# Patient Record
Sex: FEMALE | Race: WHITE | NOT HISPANIC OR LATINO | Employment: FULL TIME | URBAN - METROPOLITAN AREA
[De-identification: names, ages, dates, MRNs, and addresses within clinical notes are randomized per-mention and may not be internally consistent; named-entity substitution may affect disease eponyms.]

---

## 2022-12-21 NOTE — PROGRESS NOTES
"Chief Complaint  Consult (Excess Skin on Abdomen & Thighs)    Subjective           HPI   Deysi Ta is a 47 y.o. female who presents to [facility] as a consult  for   Chief Complaint   Patient presents with   • Consult     Excess Skin on Abdomen & Thighs   .   Patient has had gastric bypass. Starting weight is 270   lbs, current weight is 137 lbs, goal weight 137 lbs. Patient weight has been stable at current weight for 4m. Patient has rash and moisture under abdominal fold. Treats with Goldbond and Baby Powder and also uses blow dryer. History of surgeries: 2  Patient has 2 children, is done having children. Patient does not have issues with activities of daily living. Her concerns today are her abdomen and thighs.   Patient states that she does not have any additional skin retraction, with the additional weight loss, she developed more loose abdominal skin.  The lower abdominal skin hangs over the belt line causing rashes.  She has tried compression garments with no relief.  These rashes are worse in the summer.  The patient also concerned over excess medial thigh skin  Allergies: Coconut  Allergies Reconciled.    Review of Systems  All system were reviewed and were negative, except the ones noted above.     Objective     /71 (BP Location: Left arm)   Pulse 76   Temp 98 °F (36.7 °C) (Temporal)   Ht 165.1 cm (65\")   Wt 62.5 kg (137 lb 12.8 oz)   SpO2 97%   BMI 22.93 kg/m²     Body mass index is 22.93 kg/m².    Physical Exam   Cardiovascular: Normal rate.     Pulmonary/Chest  Effort normal.      • Patient is not tachycardic   • Respiration is non elaborated.   • Excess skin above and below umbilicus with grade 3 panniculus. Significant moisture, hyperpigmentation, and irritation over lower abdominal fold, does not have palpable hernia, and no open areas. Lipodystrophy of abdomen.  • The excess lower abdominal skin hangs over the belt line " approximately 6 cm.  • No diastases noted.      Result Review :       Procedures              Diagnoses and all orders for this visit:    1. Panniculitis (Primary)    2. History of weight loss surgery    3. History of weight loss        Plan:  • Panniculectomy    • I had a long discussion with the patient regarding her  panniculitis. I have explained to her  that I believe she would benefit from panniculectomy to address her excess hanging skin. I explained that this would involve a low transverse incision and would only address the excess skin below the umbilicus. She  understands that this is not a cosmetic body contouring procedure.  Scar position was discussed, including long transverse incision. We discussed recovery time which would include no heavy lifting for 4-6 weeks, drains, and abdominal binder. We discussed risks including but not limited to bleeding, infection, pain, wound healing problems, and blood clot.  • Discussed r/b of liposuction including, bruising, bleeding, infection, numbness, scarring, pain, fat embolism, asymmetries and contour irregularities. Client has realistic expectations and would like to proceed.  She understands liposuction will not be a part of a panniculectomy.  She will lose her umbilicus as a part of this panniculectomy.  There will be minimal undermining in order to redraped the skin, panniculectomy is more of a wedge resection to remove the excess lower abdominal skin.  • The patient would be an excellent candidate for panniculectomy.  Scar position was discussed, including .. . Discussed risks including, but not limited to wound healing problems, scar, blood clot, fluid collection, infection. Patient understands no heavy lifting or working out for 6 weeks and importance of ambulating post op to prevent a blood clot.        Scribed by Silvino Larson MD, acting as a scribe for Silvino Larsno MD, 12/22/22 11:29 EST.  Silvino Larson MD's signature  on the note affirms that the note adequately documents the care provided.    Patient is concerned about the scars on the medial thighs after medial thigh lift.  She understands the scar will extend from the groin down to the medial knee.  It be visible in shorts.  Even though the skin bothers her, she is concerned about the scar visibility and location and would like to give this portion of the consultation further thought.  She understands that a medial thigh lift will not be covered by insurance.      CPT code 46556    Follow Up     Return in about 4 weeks (around 1/19/2023), or For preop visit.    Patient was given instructions and counseling regarding her condition. Please see specific information pulled into the AVS if appropriate.     Silvino Larson MD  12/22/2022

## 2022-12-22 ENCOUNTER — OFFICE VISIT (OUTPATIENT)
Dept: PLASTIC SURGERY | Facility: CLINIC | Age: 47
End: 2022-12-22

## 2022-12-22 ENCOUNTER — PREP FOR SURGERY (OUTPATIENT)
Dept: OTHER | Facility: HOSPITAL | Age: 47
End: 2022-12-22

## 2022-12-22 VITALS
WEIGHT: 137.8 LBS | TEMPERATURE: 98 F | OXYGEN SATURATION: 97 % | DIASTOLIC BLOOD PRESSURE: 71 MMHG | SYSTOLIC BLOOD PRESSURE: 104 MMHG | HEART RATE: 76 BPM | BODY MASS INDEX: 22.96 KG/M2 | HEIGHT: 65 IN

## 2022-12-22 DIAGNOSIS — Z87.898 HISTORY OF WEIGHT LOSS: ICD-10-CM

## 2022-12-22 DIAGNOSIS — Z98.84 HISTORY OF WEIGHT LOSS SURGERY: ICD-10-CM

## 2022-12-22 DIAGNOSIS — M79.3 PANNICULITIS: Primary | ICD-10-CM

## 2022-12-22 PROCEDURE — 99204 OFFICE O/P NEW MOD 45 MIN: CPT | Performed by: SURGERY

## 2022-12-22 RX ORDER — BUPROPION HYDROCHLORIDE 300 MG/1
300 TABLET ORAL EVERY MORNING
COMMUNITY
Start: 2022-12-18

## 2022-12-22 RX ORDER — CEFAZOLIN SODIUM 2 G/100ML
2 INJECTION, SOLUTION INTRAVENOUS ONCE
Status: CANCELLED | OUTPATIENT
Start: 2022-12-22 | End: 2022-12-22

## 2022-12-22 RX ORDER — SEMAGLUTIDE 2.4 MG/.75ML
INJECTION, SOLUTION SUBCUTANEOUS
COMMUNITY
Start: 2022-12-18

## 2022-12-22 RX ORDER — AMOXICILLIN 875 MG/1
875 TABLET, COATED ORAL 2 TIMES DAILY
COMMUNITY
Start: 2022-12-14

## 2022-12-28 ENCOUNTER — TELEPHONE (OUTPATIENT)
Dept: PLASTIC SURGERY | Facility: CLINIC | Age: 47
End: 2022-12-28

## 2022-12-28 NOTE — TELEPHONE ENCOUNTER
Patient called to check if we have heard from insurance regarding auth decision?    Advised patient the authorization team will not submit to insurance until surgery is scheduled and then they typically submit to insurance 2-3 weeks before the surgery date. At this time we still do not have OR days for Dr. Larson, as soon as we get these days that he will be in surgery we will be calling to pick a surgery date.

## 2022-12-30 ENCOUNTER — PATIENT ROUNDING (BHMG ONLY) (OUTPATIENT)
Dept: PLASTIC SURGERY | Facility: CLINIC | Age: 47
End: 2022-12-30

## 2022-12-30 NOTE — PROGRESS NOTES
12/30/2022    My name is Barbara Silva, ELVIRA with [facility].    I want to officially welcome you to our practice and ask about your recent visit.     If you could please tell me about your recent visit with us. What things went well?     Fine, he helped me understand the procedure and what I wanted done.    We're always looking for ways to make our patients' experiences even better. Do you have recommendations on ways we may improve?    You don't have OR dates, so that is all I can think of at the moment.    Overall were you satisfied with your first visit to our practice?    Yeah, yes.    I appreciate you taking the time to answer a few questions today.    Please note that you may also receive a survey related to your visit, should you receive that we ask that you please complete it and return it so that we can monitor how we are doing with overall patient care.     Thank you and have a great day.    Barbara Silva, ELVIRA

## 2023-01-04 ENCOUNTER — TELEPHONE (OUTPATIENT)
Dept: PLASTIC SURGERY | Facility: CLINIC | Age: 48
End: 2023-01-04
Payer: COMMERCIAL

## 2023-03-31 ENCOUNTER — TELEPHONE (OUTPATIENT)
Dept: PLASTIC SURGERY | Facility: CLINIC | Age: 48
End: 2023-03-31
Payer: COMMERCIAL

## 2023-03-31 PROBLEM — M79.3 PANNICULITIS: Status: ACTIVE | Noted: 2023-03-31

## 2023-03-31 NOTE — TELEPHONE ENCOUNTER
CALLED PATIENT TO SCHEDULE SURGERY. PATIENT DID NOT ANSWER, LEFT VOICEMAIL. FIRST ATTEMPT TO CONTACT.

## 2023-04-18 NOTE — H&P (VIEW-ONLY)
"Pre-op Exam (Pre Op Exam for 05/26/2023)            History of Present Illness  Deysi Ta is a 47 y.o. female who presents to [facility] for Pre-Operative Examination for Panniculectomy 05/26/2023.     Pt presents today for pre-op.    Subjective        Coconut  Allergies Reconciled.    Review of Systems   All review of system has been reviewed and it  is negative except the ones note above.     Objective     /70 (BP Location: Left arm)   Pulse 80   Temp 98.3 °F (36.8 °C) (Temporal)   Ht 165.1 cm (65\")   Wt 62.7 kg (138 lb 3.2 oz)   SpO2 98%   BMI 23.00 kg/m²     Body mass index is 23 kg/m².    Physical Exam   Cardiovascular: Normal rate.     Pulmonary/Chest  Effort normal.   :   Abdomen  No interval change on evaluation of abdominal pannus.  No evidence of infection or active rash.  Result Review :                Assessment and Plan      Diagnoses and all orders for this visit:    1. Pre-operative examination (Primary)  -     Nicotine Screen, Urine - Urine, Clean Catch; Future    2. Panniculitis        Plan:    Panniculectomy    The risks and benefits of the procedure were discussed with the patient.  The risk described included, but not limited to, bleeding, infection, need for revision surgeries, seroma, hematoma, poor cosmetic result, poor functional result, edge necrosis, edge separation, deep venous thrombosis, pulmonary embolus, pneumonia, heart attack, stroke, death.  Her questions were answered. She still agrees to the procedure.    Discussed will create new belly button in clinic after pt healed.  Ordered nicotine urine; pt aware  Sent post op medications to pharmacy        Scribed by Abby Hamlin, acting as a scribe for Silvino Larson MD, 04/28/23 11:29 EDT.  Silvino Larson MD's signature on the note affirms that the note adequately documents the care provided.        Follow Up     Return Return to clinic 1 week after " surgery has appt schd 6/5/23.    Patient was given instructions and counseling regarding her condition. Please see specific information pulled into the AVS if appropriate.     Abby Hamlin  04/28/2023

## 2023-04-18 NOTE — PROGRESS NOTES
"Pre-op Exam (Pre Op Exam for 05/26/2023)            History of Present Illness  Deysi Ta is a 47 y.o. female who presents to [facility] for Pre-Operative Examination for Panniculectomy 05/26/2023.     Pt presents today for pre-op.    Subjective        Coconut  Allergies Reconciled.    Review of Systems   All review of system has been reviewed and it  is negative except the ones note above.     Objective     /70 (BP Location: Left arm)   Pulse 80   Temp 98.3 °F (36.8 °C) (Temporal)   Ht 165.1 cm (65\")   Wt 62.7 kg (138 lb 3.2 oz)   SpO2 98%   BMI 23.00 kg/m²     Body mass index is 23 kg/m².    Physical Exam   Cardiovascular: Normal rate.     Pulmonary/Chest  Effort normal.   :   Abdomen  No interval change on evaluation of abdominal pannus.  No evidence of infection or active rash.  Result Review :                Assessment and Plan      Diagnoses and all orders for this visit:    1. Pre-operative examination (Primary)  -     Nicotine Screen, Urine - Urine, Clean Catch; Future    2. Panniculitis        Plan:    Panniculectomy    The risks and benefits of the procedure were discussed with the patient.  The risk described included, but not limited to, bleeding, infection, need for revision surgeries, seroma, hematoma, poor cosmetic result, poor functional result, edge necrosis, edge separation, deep venous thrombosis, pulmonary embolus, pneumonia, heart attack, stroke, death.  Her questions were answered. She still agrees to the procedure.    Discussed will create new belly button in clinic after pt healed.  Ordered nicotine urine; pt aware  Sent post op medications to pharmacy        Scribed by Abby Hamlin, acting as a scribe for Silvino Larson MD, 04/28/23 11:29 EDT.  Silvino Larson MD's signature on the note affirms that the note adequately documents the care provided.        Follow Up     Return Return to clinic 1 week after " surgery has appt schd 6/5/23.    Patient was given instructions and counseling regarding her condition. Please see specific information pulled into the AVS if appropriate.     Abby Hamlin  04/28/2023

## 2023-04-21 ENCOUNTER — PREP FOR SURGERY (OUTPATIENT)
Dept: OTHER | Facility: HOSPITAL | Age: 48
End: 2023-04-21
Payer: COMMERCIAL

## 2023-04-21 DIAGNOSIS — M79.3 PANNICULITIS: Primary | ICD-10-CM

## 2023-04-21 RX ORDER — CEFAZOLIN SODIUM 2 G/100ML
2 INJECTION, SOLUTION INTRAVENOUS ONCE
OUTPATIENT
Start: 2023-04-21 | End: 2023-04-21

## 2023-04-28 ENCOUNTER — OFFICE VISIT (OUTPATIENT)
Dept: PLASTIC SURGERY | Facility: CLINIC | Age: 48
End: 2023-04-28
Payer: COMMERCIAL

## 2023-04-28 ENCOUNTER — LAB (OUTPATIENT)
Dept: LAB | Facility: HOSPITAL | Age: 48
End: 2023-04-28
Payer: COMMERCIAL

## 2023-04-28 VITALS
HEART RATE: 80 BPM | DIASTOLIC BLOOD PRESSURE: 70 MMHG | BODY MASS INDEX: 23.03 KG/M2 | WEIGHT: 138.2 LBS | OXYGEN SATURATION: 98 % | SYSTOLIC BLOOD PRESSURE: 121 MMHG | TEMPERATURE: 98.3 F | HEIGHT: 65 IN

## 2023-04-28 DIAGNOSIS — M79.3 PANNICULITIS: ICD-10-CM

## 2023-04-28 DIAGNOSIS — Z01.818 PRE-OPERATIVE EXAMINATION: ICD-10-CM

## 2023-04-28 DIAGNOSIS — Z01.818 PRE-OPERATIVE EXAMINATION: Primary | ICD-10-CM

## 2023-04-28 LAB — COTININE UR-MCNC: NEGATIVE NG/ML

## 2023-04-28 PROCEDURE — G0480 DRUG TEST DEF 1-7 CLASSES: HCPCS

## 2023-04-28 RX ORDER — CEPHALEXIN 500 MG/1
500 CAPSULE ORAL 4 TIMES DAILY
Qty: 20 CAPSULE | Refills: 0 | Status: SHIPPED | OUTPATIENT
Start: 2023-04-28 | End: 2023-05-03

## 2023-04-28 RX ORDER — OXYCODONE HYDROCHLORIDE AND ACETAMINOPHEN 5; 325 MG/1; MG/1
1-2 TABLET ORAL EVERY 6 HOURS PRN
Qty: 28 TABLET | Refills: 0 | Status: SHIPPED | OUTPATIENT
Start: 2023-04-28

## 2023-04-28 RX ORDER — PROMETHAZINE HYDROCHLORIDE 25 MG/1
25 TABLET ORAL EVERY 6 HOURS PRN
Qty: 20 TABLET | Refills: 0 | Status: SHIPPED | OUTPATIENT
Start: 2023-04-28

## 2023-05-24 NOTE — PRE-PROCEDURE INSTRUCTIONS
PATIENT INSTRUCTED TO BE:    - NPO AFTER MIDNIGHT EXCEPT CAN HAVE CLEAR LIQUIDS 2 HOURS PRIOR TO SURGERY ARRIVAL TIME     - TO HOLD ALL VITAMINS, SUPPLEMENTS, NSAIDS FOR ONE WEEK PRIOR TO THEIR SURGICAL PROCEDURE    - INSTRUCTED PT TO USE SURGICAL SOAP 1 TIME THE NIGHT PRIOR TO SURGERY OR THE AM OF SURGERY.   USE SOAP FROM NECK TO TOES AVOID THEIR FACE, HAIR, AND PRIVATE PARTS. INSTRUCTED NO LOTIONS, JEWELRY, PIERCINGS, OR DEODORANT DAY OF SURGERY        - INSTRUCTED TO TAKE THE FOLLOWING MEDICATIONS THE DAY OF SURGERY:   none    PATIENT VERBALIZED UNDERSTANDING

## 2023-05-26 ENCOUNTER — ANESTHESIA EVENT (OUTPATIENT)
Dept: PERIOP | Facility: HOSPITAL | Age: 48
End: 2023-05-26
Payer: COMMERCIAL

## 2023-05-26 ENCOUNTER — HOSPITAL ENCOUNTER (OUTPATIENT)
Facility: HOSPITAL | Age: 48
Setting detail: HOSPITAL OUTPATIENT SURGERY
Discharge: HOME | End: 2023-05-26
Attending: SURGERY | Admitting: SURGERY
Payer: COMMERCIAL

## 2023-05-26 ENCOUNTER — ANESTHESIA (OUTPATIENT)
Dept: PERIOP | Facility: HOSPITAL | Age: 48
End: 2023-05-26
Payer: COMMERCIAL

## 2023-05-26 VITALS
WEIGHT: 141.09 LBS | TEMPERATURE: 97.1 F | OXYGEN SATURATION: 97 % | SYSTOLIC BLOOD PRESSURE: 117 MMHG | HEIGHT: 65 IN | DIASTOLIC BLOOD PRESSURE: 84 MMHG | RESPIRATION RATE: 17 BRPM | BODY MASS INDEX: 23.51 KG/M2 | HEART RATE: 81 BPM

## 2023-05-26 DIAGNOSIS — M79.3 PANNICULITIS: ICD-10-CM

## 2023-05-26 LAB — B-HCG UR QL: NEGATIVE

## 2023-05-26 PROCEDURE — 25010000002 FENTANYL CITRATE (PF) 50 MCG/ML SOLUTION: Performed by: NURSE ANESTHETIST, CERTIFIED REGISTERED

## 2023-05-26 PROCEDURE — 88302 TISSUE EXAM BY PATHOLOGIST: CPT | Performed by: SURGERY

## 2023-05-26 PROCEDURE — 25010000002 CEFAZOLIN IN DEXTROSE 2-4 GM/100ML-% SOLUTION: Performed by: SURGERY

## 2023-05-26 PROCEDURE — 15847 EXC SKIN ABD ADD-ON: CPT

## 2023-05-26 PROCEDURE — 25010000002 SUGAMMADEX 200 MG/2ML SOLUTION: Performed by: NURSE ANESTHETIST, CERTIFIED REGISTERED

## 2023-05-26 PROCEDURE — 25010000002 PROPOFOL 10 MG/ML EMULSION: Performed by: NURSE ANESTHETIST, CERTIFIED REGISTERED

## 2023-05-26 PROCEDURE — 25010000002 DEXAMETHASONE PER 1 MG: Performed by: NURSE ANESTHETIST, CERTIFIED REGISTERED

## 2023-05-26 PROCEDURE — 0 HYDROMORPHONE 1 MG/ML SOLUTION: Performed by: NURSE ANESTHETIST, CERTIFIED REGISTERED

## 2023-05-26 PROCEDURE — 81025 URINE PREGNANCY TEST: CPT | Performed by: SURGERY

## 2023-05-26 PROCEDURE — 15847 EXC SKIN ABD ADD-ON: CPT | Performed by: SURGERY

## 2023-05-26 PROCEDURE — 15830 EXC EXCESSIVE SKIN ABDOMEN: CPT | Performed by: SURGERY

## 2023-05-26 PROCEDURE — 25010000002 ONDANSETRON PER 1 MG: Performed by: NURSE ANESTHETIST, CERTIFIED REGISTERED

## 2023-05-26 PROCEDURE — 15830 EXC EXCESSIVE SKIN ABDOMEN: CPT

## 2023-05-26 PROCEDURE — 25010000002 HYDROMORPHONE 1 MG/ML SOLUTION: Performed by: NURSE ANESTHETIST, CERTIFIED REGISTERED

## 2023-05-26 PROCEDURE — 25010000002 MIDAZOLAM PER 1MG: Performed by: ANESTHESIOLOGY

## 2023-05-26 DEVICE — ABSORBABLE WOUND CLOSURE DEVICE
Type: IMPLANTABLE DEVICE | Site: ABDOMEN | Status: FUNCTIONAL
Brand: V-LOC 90

## 2023-05-26 RX ORDER — SCOLOPAMINE TRANSDERMAL SYSTEM 1 MG/1
1 PATCH, EXTENDED RELEASE TRANSDERMAL ONCE
Status: DISCONTINUED | OUTPATIENT
Start: 2023-05-26 | End: 2023-05-26 | Stop reason: HOSPADM

## 2023-05-26 RX ORDER — DEXMEDETOMIDINE HYDROCHLORIDE 100 UG/ML
INJECTION, SOLUTION INTRAVENOUS AS NEEDED
Status: DISCONTINUED | OUTPATIENT
Start: 2023-05-26 | End: 2023-05-26 | Stop reason: SURG

## 2023-05-26 RX ORDER — ONDANSETRON 2 MG/ML
INJECTION INTRAMUSCULAR; INTRAVENOUS AS NEEDED
Status: DISCONTINUED | OUTPATIENT
Start: 2023-05-26 | End: 2023-05-26 | Stop reason: SURG

## 2023-05-26 RX ORDER — CEFAZOLIN SODIUM 2 G/100ML
2 INJECTION, SOLUTION INTRAVENOUS ONCE
Status: COMPLETED | OUTPATIENT
Start: 2023-05-26 | End: 2023-05-26

## 2023-05-26 RX ORDER — OXYCODONE HYDROCHLORIDE 5 MG/1
5 TABLET ORAL
Status: DISCONTINUED | OUTPATIENT
Start: 2023-05-26 | End: 2023-05-26 | Stop reason: HOSPADM

## 2023-05-26 RX ORDER — ACETAMINOPHEN 500 MG
1000 TABLET ORAL ONCE
Status: COMPLETED | OUTPATIENT
Start: 2023-05-26 | End: 2023-05-26

## 2023-05-26 RX ORDER — ROCURONIUM BROMIDE 10 MG/ML
INJECTION, SOLUTION INTRAVENOUS AS NEEDED
Status: DISCONTINUED | OUTPATIENT
Start: 2023-05-26 | End: 2023-05-26 | Stop reason: SURG

## 2023-05-26 RX ORDER — LIDOCAINE HYDROCHLORIDE 20 MG/ML
INJECTION, SOLUTION EPIDURAL; INFILTRATION; INTRACAUDAL; PERINEURAL AS NEEDED
Status: DISCONTINUED | OUTPATIENT
Start: 2023-05-26 | End: 2023-05-26 | Stop reason: SURG

## 2023-05-26 RX ORDER — GLYCOPYRROLATE 0.2 MG/ML
INJECTION INTRAMUSCULAR; INTRAVENOUS AS NEEDED
Status: DISCONTINUED | OUTPATIENT
Start: 2023-05-26 | End: 2023-05-26 | Stop reason: SURG

## 2023-05-26 RX ORDER — PROMETHAZINE HYDROCHLORIDE 25 MG/1
25 SUPPOSITORY RECTAL ONCE AS NEEDED
Status: DISCONTINUED | OUTPATIENT
Start: 2023-05-26 | End: 2023-05-26 | Stop reason: HOSPADM

## 2023-05-26 RX ORDER — FENTANYL CITRATE 50 UG/ML
INJECTION, SOLUTION INTRAMUSCULAR; INTRAVENOUS AS NEEDED
Status: DISCONTINUED | OUTPATIENT
Start: 2023-05-26 | End: 2023-05-26 | Stop reason: SURG

## 2023-05-26 RX ORDER — MEPERIDINE HYDROCHLORIDE 25 MG/ML
12.5 INJECTION INTRAMUSCULAR; INTRAVENOUS; SUBCUTANEOUS
Status: DISCONTINUED | OUTPATIENT
Start: 2023-05-26 | End: 2023-05-26 | Stop reason: HOSPADM

## 2023-05-26 RX ORDER — DEXAMETHASONE SODIUM PHOSPHATE 4 MG/ML
INJECTION, SOLUTION INTRA-ARTICULAR; INTRALESIONAL; INTRAMUSCULAR; INTRAVENOUS; SOFT TISSUE AS NEEDED
Status: DISCONTINUED | OUTPATIENT
Start: 2023-05-26 | End: 2023-05-26 | Stop reason: SURG

## 2023-05-26 RX ORDER — SODIUM CHLORIDE, SODIUM LACTATE, POTASSIUM CHLORIDE, CALCIUM CHLORIDE 600; 310; 30; 20 MG/100ML; MG/100ML; MG/100ML; MG/100ML
9 INJECTION, SOLUTION INTRAVENOUS CONTINUOUS PRN
Status: DISCONTINUED | OUTPATIENT
Start: 2023-05-26 | End: 2023-05-26 | Stop reason: HOSPADM

## 2023-05-26 RX ORDER — PROPOFOL 10 MG/ML
VIAL (ML) INTRAVENOUS AS NEEDED
Status: DISCONTINUED | OUTPATIENT
Start: 2023-05-26 | End: 2023-05-26 | Stop reason: SURG

## 2023-05-26 RX ORDER — PHENYLEPHRINE HCL IN 0.9% NACL 1 MG/10 ML
SYRINGE (ML) INTRAVENOUS AS NEEDED
Status: DISCONTINUED | OUTPATIENT
Start: 2023-05-26 | End: 2023-05-26 | Stop reason: SURG

## 2023-05-26 RX ORDER — MIDAZOLAM HYDROCHLORIDE 2 MG/2ML
2 INJECTION, SOLUTION INTRAMUSCULAR; INTRAVENOUS ONCE
Status: COMPLETED | OUTPATIENT
Start: 2023-05-26 | End: 2023-05-26

## 2023-05-26 RX ORDER — ONDANSETRON 2 MG/ML
4 INJECTION INTRAMUSCULAR; INTRAVENOUS ONCE AS NEEDED
Status: DISCONTINUED | OUTPATIENT
Start: 2023-05-26 | End: 2023-05-26 | Stop reason: HOSPADM

## 2023-05-26 RX ORDER — PROMETHAZINE HYDROCHLORIDE 12.5 MG/1
25 TABLET ORAL ONCE AS NEEDED
Status: DISCONTINUED | OUTPATIENT
Start: 2023-05-26 | End: 2023-05-26 | Stop reason: HOSPADM

## 2023-05-26 RX ADMIN — Medication 200 MCG: at 10:45

## 2023-05-26 RX ADMIN — DEXMEDETOMIDINE HYDROCHLORIDE 10 MCG: 100 INJECTION, SOLUTION, CONCENTRATE INTRAVENOUS at 10:27

## 2023-05-26 RX ADMIN — MIDAZOLAM HYDROCHLORIDE 2 MG: 1 INJECTION, SOLUTION INTRAMUSCULAR; INTRAVENOUS at 10:21

## 2023-05-26 RX ADMIN — DEXAMETHASONE SODIUM PHOSPHATE 4 MG: 4 INJECTION, SOLUTION INTRA-ARTICULAR; INTRALESIONAL; INTRAMUSCULAR; INTRAVENOUS; SOFT TISSUE at 10:59

## 2023-05-26 RX ADMIN — ROCURONIUM BROMIDE 30 MG: 10 INJECTION, SOLUTION INTRAVENOUS at 11:11

## 2023-05-26 RX ADMIN — SCOPOLAMINE 1 PATCH: 1.5 PATCH, EXTENDED RELEASE TRANSDERMAL at 09:13

## 2023-05-26 RX ADMIN — HYDROMORPHONE HYDROCHLORIDE 0.5 MG: 1 INJECTION, SOLUTION INTRAMUSCULAR; INTRAVENOUS; SUBCUTANEOUS at 12:29

## 2023-05-26 RX ADMIN — ONDANSETRON 4 MG: 2 INJECTION INTRAMUSCULAR; INTRAVENOUS at 10:59

## 2023-05-26 RX ADMIN — FENTANYL CITRATE 50 MCG: 50 INJECTION, SOLUTION INTRAMUSCULAR; INTRAVENOUS at 10:31

## 2023-05-26 RX ADMIN — PROPOFOL 150 MG: 10 INJECTION, EMULSION INTRAVENOUS at 10:31

## 2023-05-26 RX ADMIN — GLYCOPYRROLATE 0.2 MG: 0.2 INJECTION INTRAMUSCULAR; INTRAVENOUS at 11:42

## 2023-05-26 RX ADMIN — ROCURONIUM BROMIDE 50 MG: 10 INJECTION, SOLUTION INTRAVENOUS at 10:31

## 2023-05-26 RX ADMIN — LIDOCAINE HYDROCHLORIDE 100 MG: 20 INJECTION, SOLUTION EPIDURAL; INFILTRATION; INTRACAUDAL; PERINEURAL at 11:44

## 2023-05-26 RX ADMIN — HYDROMORPHONE HYDROCHLORIDE 1 MG: 1 INJECTION, SOLUTION INTRAMUSCULAR; INTRAVENOUS; SUBCUTANEOUS at 12:13

## 2023-05-26 RX ADMIN — FENTANYL CITRATE 25 MCG: 50 INJECTION, SOLUTION INTRAMUSCULAR; INTRAVENOUS at 11:37

## 2023-05-26 RX ADMIN — ACETAMINOPHEN 1000 MG: 500 TABLET ORAL at 09:13

## 2023-05-26 RX ADMIN — DEXMEDETOMIDINE HYDROCHLORIDE 10 MCG: 100 INJECTION, SOLUTION, CONCENTRATE INTRAVENOUS at 10:37

## 2023-05-26 RX ADMIN — LIDOCAINE HYDROCHLORIDE 60 MG: 20 INJECTION, SOLUTION EPIDURAL; INFILTRATION; INTRACAUDAL; PERINEURAL at 10:31

## 2023-05-26 RX ADMIN — CEFAZOLIN SODIUM 2 G: 2 INJECTION, SOLUTION INTRAVENOUS at 10:25

## 2023-05-26 RX ADMIN — SODIUM CHLORIDE, POTASSIUM CHLORIDE, SODIUM LACTATE AND CALCIUM CHLORIDE: 600; 310; 30; 20 INJECTION, SOLUTION INTRAVENOUS at 11:36

## 2023-05-26 RX ADMIN — SUGAMMADEX 200 MG: 100 INJECTION, SOLUTION INTRAVENOUS at 11:47

## 2023-05-26 RX ADMIN — FENTANYL CITRATE 25 MCG: 50 INJECTION, SOLUTION INTRAMUSCULAR; INTRAVENOUS at 11:55

## 2023-05-26 RX ADMIN — SODIUM CHLORIDE, POTASSIUM CHLORIDE, SODIUM LACTATE AND CALCIUM CHLORIDE 9 ML/HR: 600; 310; 30; 20 INJECTION, SOLUTION INTRAVENOUS at 09:13

## 2023-05-26 NOTE — ANESTHESIA POSTPROCEDURE EVALUATION
Patient: Deysi Ta    Procedure Summary     Date: 05/26/23 Room / Location: [facility] OR  / [facility]    Anesthesia Start: 1024 Anesthesia Stop: 1217    Procedure: PANNICULECTOMY (Abdomen) Diagnosis:       Panniculitis      (Panniculitis [M79.3])    Surgeons: Silvino Larson MD Provider: Manuel Price MD    Anesthesia Type: general ASA Status: 1          Anesthesia Type: general    Vitals  Vitals Value Taken Time   /73 05/26/23 1300   Temp 36.3 °C (97.3 °F) 05/26/23 1216   Pulse 79 05/26/23 1300   Resp 17 05/26/23 1216   SpO2 97 % 05/26/23 1300           Post Anesthesia Care and Evaluation    Patient location during evaluation: bedside  Patient participation: complete - patient participated  Level of consciousness: awake  Pain management: adequate    Airway patency: patent  PONV Status: none  Cardiovascular status: acceptable and stable  Respiratory status: acceptable  Hydration status: acceptable    Comments: An Anesthesiologist personally participated in the most demanding procedures (including induction and emergence if applicable) in the anesthesia plan, monitored the course of anesthesia administration at frequent intervals and remained physically present and available for immediate diagnosis and treatment of emergencies.

## 2023-05-26 NOTE — ANESTHESIA PREPROCEDURE EVALUATION
Anesthesia Evaluation     Patient summary reviewed and Nursing notes reviewed   no history of anesthetic complications:  NPO Solid Status: > 8 hours  NPO Liquid Status: > 2 hours           Airway   Mallampati: I  TM distance: >3 FB  Neck ROM: full  No difficulty expected  Dental      Pulmonary - negative pulmonary ROS and normal exam    breath sounds clear to auscultation  Cardiovascular - negative cardio ROS and normal exam  Exercise tolerance: good (4-7 METS)    Rhythm: regular  Rate: normal        Neuro/Psych- negative ROS  GI/Hepatic/Renal/Endo - negative ROS     Musculoskeletal     Abdominal    Substance History      OB/GYN          Other        ROS/Med Hx Other: PAT Nursing Notes unavailable.                   Anesthesia Plan    ASA 1     general       Anesthetic plan, risks, benefits, and alternatives have been provided, discussed and informed consent has been obtained with: patient.        CODE STATUS:

## 2023-05-26 NOTE — OP NOTE
Plastic Surgery Op Note    PANNICULECTOMY    Deysi Ta  5/26/2023    Pre-op Diagnosis:   Panniculitis [M79.3]    Post-Op Diagnosis Codes:     * Panniculitis [M79.3]    Anesthesia: General    Staff:   SURGEON: Neo  Circulator: Galilea Lacy RN; Suni Stanley RN; Shannon Woodward RN  Scrub Person: Milly Rodgers  Assistant: Caitlyn Medeiros RN CSA    Estimated Blood Loss: 100ml    Specimens:   Order Name Source Comment Collection Info Order Time   PREGNANCY, URINE Urine, Clean Catch  Collected By: Kimberly Govea 5/26/2023  8:54 AM     Release to patient   Routine Release        PREGNANCY, URINE Urine, Clean Catch   5/26/2023  9:06 AM     Release to patient   Routine Release        TISSUE PATHOLOGY EXAM Abdomen, Lower FOR GROSS PATHOLOGY ONLY Collected By: Silvino Larson MD 5/26/2023 11:14 AM     Release to patient   Routine Release              Drains:   Closed/Suction Drain 1 Right RLQ Bulb 19 Fr. (Active)       Closed/Suction Drain 2 Right RLQ Bulb 19 Fr. (Active)       Findings: Specimen weight 825 g    Complications: None    Indications for procedure: Lower abdominal pannus    Description of the procedure:  Patient is a 47-year-old female who after successfully losing a significant amount of weight presents with excess lower abdominal skin.  The risks and benefits of the procedure were discussed.  Her questions were answered.  She agrees to the procedure.  The patient marked in the standing position in the holding area.  She is then brought back to the opera placed in supine position.  After general anesthesia was induced, her abdomen was sterilely prepped and draped in the standard surgical fashion.  Beginning on the lower abdomen, a curvilinear incision was made hip to hip going through the suprapubic crease, approximately 2 cm inferior to her Pfannenstiel incision.  The rest of the dissection was continued with electrocautery, down to Janny's fascia and through it.  Laterally  the fat below Janny's fascia was left over the fascia.  Even at the midline, a thin layer of deep subcutaneous fat with was left on the muscular fascia.  The dissection was continued superiorly, transecting the umbilical stalk and then proceeding superiorly for a distance of may be 4 or 5 cm.  The skin was then pulled down and based on how much skin people down, the superior incision was made and the lower abdominal pannus was divided from the superior abdominal flap with electrocautery.  Wound was irrigated reexamined and no bleeding was noted.  The closure was then performed after 2 drains were placed and brought out through the right lower abdomen/hip.  The skin closure was performed at the midline first and then Janny's layer was closed with interrupted 2-0 Vicryl.  The skin was then reapproximated at the level of the dermis.  Drain sutures were used to secure the drains.  A running 2-0 subcuticular Quill suture was used to reapproximate the skin edges.  At the end of the case sterile dressings were placed and she was placed into an abdominal binder and taken to recovery in stable condition.        HELEN Ray, was present throughout the end entire case.  She assisted with exposure as well as hemostasis.  She assisted with skin closure.  Her assistance was essential to the efficient completion of this case.    .me  05/26/23  12:10 EDT

## 2023-05-26 NOTE — DISCHARGE INSTRUCTIONS
"Strip and empty drains 3-4 times a day.   If there is any question, please call the office   You can also search YouTube for \"HALEY drain care\", \"Gaurang-Gonzales drain care\"   The drains are secured with suture.  They will be removed by the physician in your postoperative clinic visits  Remove dressings in 2 days and take a shower.  Secure the drains so they do not pull or fall.   Consider tying shoelaces or such loosely around your neck and placing them on the shoelace, like a necklace.     DISCHARGE INSTRUCTIONS  SURGICAL / AMBULATORY  PROCEDURES      For your surgery you had:  General anesthesia (you may have a sore throat for the first 24 hours)    Local anesthesia    You received a medicated patch for nausea prevention today (behind your ear). It is recommended that you remove it 24-48 hours post-operatively. It must be removed within 72 hours.   You have received an anesthesia medication today that can cause hormonal forms of birth control to be ineffective. You should use a different form of birth control (to prevent pregnancy) for 7 days.   You may experience dizziness, drowsiness, or light-headedness for several hours following surgery/procedure.  Do not stay alone today or tonight.  Limit your activity for 24 hours.  Resume your diet slowly.  Follow whatever special dietary instructions you may have been given by your doctor.  You should not drive or operate machinery, drink alcohol, or sign legally binding documents for 24 hours or while you are taking pain medication.  Last dose of pain medication was given at:   .  NOTIFY YOUR DOCTOR IF YOU EXPERIENCE ANY OF THE FOLLOWING:  Temperature greater than 101 degrees Fahrenheit  Shaking Chills  Redness or excessive drainage from incision  Nausea, vomiting and/or pain that is not controlled by prescribed medications  Increase in bleeding or bleeding that is excessive  Unable to urinate in 6 hours after surgery  If unable to reach your doctor, please go to the " closest Emergency Room    You may remove Band-Aid/dressing sunday  You may shower sunday  .  Apply an ice pack 24-48 hours.  Medications per physician instructions as indicated on Discharge Medication Information Sheet.  You should see   for follow-up care   on   .  Phone number:       SPECIAL INSTRUCTIONS:

## 2023-05-26 NOTE — INTERVAL H&P NOTE
H&P reviewed. The patient was examined and there are no changes to the H&P.        Patient's  present throughout.  Patient marked as in the standing position.  The risks and benefits of the procedure were again discussed with the patient.    Patient mention that she wanted the drains brought out of the right side because she sleeps left side down

## 2023-05-30 LAB
CYTO UR: NORMAL
LAB AP CASE REPORT: NORMAL
LAB AP CLINICAL INFORMATION: NORMAL
PATH REPORT.FINAL DX SPEC: NORMAL
PATH REPORT.GROSS SPEC: NORMAL

## 2023-06-05 ENCOUNTER — OFFICE VISIT (OUTPATIENT)
Dept: PLASTIC SURGERY | Facility: CLINIC | Age: 48
End: 2023-06-05
Payer: COMMERCIAL

## 2023-06-05 VITALS
HEIGHT: 65 IN | HEART RATE: 72 BPM | BODY MASS INDEX: 23.09 KG/M2 | OXYGEN SATURATION: 92 % | TEMPERATURE: 98.5 F | SYSTOLIC BLOOD PRESSURE: 102 MMHG | DIASTOLIC BLOOD PRESSURE: 69 MMHG | WEIGHT: 138.6 LBS

## 2023-06-05 DIAGNOSIS — Z09 POSTOPERATIVE FOLLOW-UP: Primary | ICD-10-CM

## 2023-06-05 PROCEDURE — 99024 POSTOP FOLLOW-UP VISIT: CPT | Performed by: SURGERY

## 2023-06-05 NOTE — PROGRESS NOTES
"Chief Complaint  Post-op Follow-up (1 week panniculectomy)    Subjective          History of Present Illness  Deysi Ta is a 47 y.o. female who presents to [facility] for Postoperative Follow-Up of panniculectomy..  Doing ok.  Denies pain.  Using ibuprofen for discomfort.    Reports Drain #1 50-70 cc daily and Drain #2 less than 10 cc  for past 5 days.          Allergies: Coconut  Allergies Reconciled.    Review of Systems   All review of system has been reviewed and it  is negative except the ones note above.     Objective     /69 (BP Location: Left arm, Patient Position: Sitting)   Pulse 72   Temp 98.5 °F (36.9 °C) (Temporal)   Ht 165.1 cm (65\")   Wt 62.9 kg (138 lb 9.6 oz)   SpO2 92%   BMI 23.06 kg/m²     Body mass index is 23.06 kg/m².    Physical Exam   Cardiovascular: Normal rate.     Pulmonary/Chest  Effort normal.   Abdomen  Incision closed and clean.  Small amount of reactive erythema on the right lateral aspect around some of the staples.  Otherwise doing well.  Both drains have serous drainage.  Not turbid, not cloudy.  Abdominal wall clean, no fluctuance, no bruising.  Least productive drain was removed.  All the staples in the left and half of the staples on the right were removed.      Result Review :                Assessment and Plan      Diagnoses and all orders for this visit:    1. Postoperative follow-up (Primary)        Plan:  Drain 2 was removed today  Removed 1/2 staples were removed today in clinic  Doing well with no evidence of fluid collection or infection  Patient encouraged to continue appropriate wound care and moderate activity.  Less active is better than excessively active.      Follow Up     Return RTC 1 week for 2 week post op.    Patient was given instructions and counseling regarding her condition. Please see specific information pulled into the AVS if appropriate.     Silvino Larson MD  06/05/2023   "

## 2023-06-05 NOTE — PROGRESS NOTES
"Chief Complaint  Post-op Follow-up (2 week post op)    Subjective          History of Present Illness  Deysi Ta is a 47 y.o. female who presents to [facility] for Postoperative Follow-Up of panniculectomy. 5/26/23.    Doing ok.  Denies pain.    Drain less than 15cc daily x 5 days      Allergies: Coconut  Allergies Reconciled.    Review of Systems   All review of system has been reviewed and it  is negative except the ones note above.     Objective     /77 (BP Location: Left arm, Patient Position: Sitting, Cuff Size: Adult)   Pulse 82   Temp 98.4 °F (36.9 °C) (Temporal)   Ht 165.1 cm (65\")   Wt 62.3 kg (137 lb 6.4 oz)   SpO2 97%   BMI 22.86 kg/m²     Body mass index is 22.86 kg/m².    Physical Exam   Cardiovascular: Normal rate.     Pulmonary/Chest  Effort normal.   Abdomen  Abdominal incision closed and clean.  No overlying erythema or induration.  No fluctuance.  Drainage is thin and serous, not cloudy, not turbid.    Drain removed.  Patient tolerated this well.  Result Review :                Assessment and Plan      Diagnoses and all orders for this visit:    1. Postoperative follow-up (Primary)        Plan:  Removed drain and staples today in clinic  No worrisome changes    Patient encouraged to continue use of compression garments.    Scribed by Abby Hamlin, acting as a scribe for Silvino Larson MD, 06/12/23 09:24 EDT.  Silvino Larson MD's signature on the note affirms that the note adequately documents the care provided.     Follow Up     Return RTC in 1 week.    Patient was given instructions and counseling regarding her condition. Please see specific information pulled into the AVS if appropriate.     Silvino Larson MD  06/12/2023   "

## 2023-06-05 NOTE — PATIENT INSTRUCTIONS
Patient encouraged to apply a silicone scar sheeting to the incision.  Patient to leave this in contact with the skin for 12 hours or more a day.  The silicone scar sheeting should last about 1 month before needing to be replaced.  Use this for 3 months.  This will cause more rapid scar maturation and improved scar quality.  Examples include scar away, silicone scar sheeting, etc.  These may be obtained over-the-counter at pharmacies, [employer], [facility]

## 2023-06-12 ENCOUNTER — OFFICE VISIT (OUTPATIENT)
Dept: PLASTIC SURGERY | Facility: CLINIC | Age: 48
End: 2023-06-12
Payer: COMMERCIAL

## 2023-06-12 VITALS
HEIGHT: 65 IN | DIASTOLIC BLOOD PRESSURE: 77 MMHG | OXYGEN SATURATION: 97 % | BODY MASS INDEX: 22.89 KG/M2 | HEART RATE: 82 BPM | WEIGHT: 137.4 LBS | TEMPERATURE: 98.4 F | SYSTOLIC BLOOD PRESSURE: 112 MMHG

## 2023-06-12 DIAGNOSIS — Z09 POSTOPERATIVE FOLLOW-UP: Primary | ICD-10-CM

## 2023-06-12 PROCEDURE — 99024 POSTOP FOLLOW-UP VISIT: CPT | Performed by: SURGERY

## 2023-07-17 NOTE — PROGRESS NOTES
"Chief Complaint  No chief complaint on file.    Subjective              History of Present Illness  Deysi Ta is a 47 y.o. female who presents to [facility] as an in office procedure of Reconstruction of belly button    Allergies: Coconut  Allergies Reconciled.    Review of Systems   All review of system has been reviewed and it  is negative except the ones note above.     Objective     /68 (BP Location: Left arm, Patient Position: Sitting, Cuff Size: Adult)   Pulse 84   Temp 98 °F (36.7 °C) (Temporal)   Ht 165.1 cm (65\")   Wt 63.8 kg (140 lb 9.6 oz)   SpO2 97%   BMI 23.40 kg/m²     Body mass index is 23.4 kg/m².    Physical Exam   Cardiovascular: Normal rate.     Pulmonary/Chest  Effort normal.     Cardiovascular: Normal rate    Pulmonary/Chest: Effort normal    Trunk  While standing in front of the full-length mirror, the patient and I agreed on the marking for the new umbilical location.    No worrisome changes to the prior panniculectomy incisions  Result Review :             Excision Procedure: Consent obtained. Local injected to site, Lidocaine 1% with epi.  Site prepped with ChloraPrep  in sterile fashion. Site draped in sterile fashion. I dissected down through skin and subcutaneous tissue completely for the cruciate incision for the umbilical reconstruction.  The triangular flaps were raised and the underlying subcutaneous fat was sharply excised towards the fascia of the abdominal wall musculature.  The tips of the flaps were then secured with 4-0 Vicryl down to the abdominal wall fascia to create an indented scar similar to an umbilicus.. Established hemostasis with direct pressure. Site was thoroughly irrigated.  Site cleaned with sterile normal saline.  The umbilicus was packed with Xeroform and covered with a compression dressing.    The patient tolerated the procedure well with no immediate complications.            Assessment and " Plan      Diagnoses and all orders for this visit:    1. Postoperative follow-up (Primary)        Plan:  Umbilical reconstruction as a part of the panniculectomy.  Covered by the global period.  The risks and benefits of the procedure were discussed.  Her questions were answered and she agreed to the procedure.        Follow Up     No follow-ups on file.    Patient was given instructions and counseling regarding her condition. Please see specific information pulled into the AVS if appropriate.     Silvino Larson MD  07/27/2023

## 2023-07-27 ENCOUNTER — PROCEDURE VISIT (OUTPATIENT)
Dept: PLASTIC SURGERY | Facility: CLINIC | Age: 48
End: 2023-07-27
Payer: COMMERCIAL

## 2023-07-27 VITALS
DIASTOLIC BLOOD PRESSURE: 68 MMHG | HEIGHT: 65 IN | TEMPERATURE: 98 F | OXYGEN SATURATION: 97 % | WEIGHT: 140.6 LBS | HEART RATE: 84 BPM | BODY MASS INDEX: 23.43 KG/M2 | SYSTOLIC BLOOD PRESSURE: 102 MMHG

## 2023-07-27 DIAGNOSIS — Z09 POSTOPERATIVE FOLLOW-UP: Primary | ICD-10-CM

## 2023-07-27 PROCEDURE — 99024 POSTOP FOLLOW-UP VISIT: CPT | Performed by: SURGERY

## (undated) DEVICE — PROXIMATE RH ROTATING HEAD SKIN STAPLERS (35 WIDE) CONTAINS 35 STAINLESS STEEL STAPLES: Brand: PROXIMATE

## (undated) DEVICE — DRSNG PAD ABD 8X10IN STRL

## (undated) DEVICE — SUT VIC 2/0 CT1 36IN

## (undated) DEVICE — BLAKE SILICONE DRAIN, 19 FR ROUND, HUBLESS WITH 1/4" TROCAR: Brand: BLAKE

## (undated) DEVICE — SUT ETHLN 3-0 FS118IN 663H

## (undated) DEVICE — BIOPATCH™ ANTIMICROBIAL DRESSING WITH CHLORHEXIDINE GLUCONATE IS A HYDROPHILLIC POLYURETHANE ABSORPTIVE FOAM WITH CHLORHEXIDINE GLUCONATE (CHG) WHICH INHIBITS BACTERIAL GROWTH UNDER THE DRESSING. THE DRESSING IS INTENDED TO BE USED TO ABSORB EXUDATE, COVER A WOUND CAUSED BY VASCULAR AND NONVASCULAR PERCUTANEOUS MEDICAL DEVICES DURING SURGERY, AS WELL AS REDUCE LOCAL INFECTION AND COLONIZATION OF MICROORGANISMS.: Brand: BIOPATCH

## (undated) DEVICE — COVER,LIGHT HANDLE,FLX,1/PK: Brand: MEDLINE INDUSTRIES, INC.

## (undated) DEVICE — GLV SURG SENSICARE SLT PF LF 5.5 STRL

## (undated) DEVICE — DRSNG SURESITE123 8X12IN

## (undated) DEVICE — JACKSON-PRATT 100CC BULB RESERVOIR: Brand: CARDINAL HEALTH

## (undated) DEVICE — SUT VIC 3/0 SH 27IN J416H

## (undated) DEVICE — STERILE POLYISOPRENE POWDER-FREE SURGICAL GLOVES: Brand: PROTEXIS

## (undated) DEVICE — NON-WOVEN ADHESIVE WOUND DRESSING: Brand: PRIMAPORE ADHESIVE DRESSING 10*8CM

## (undated) DEVICE — KT DRP SPY/PHI W/ICG 25MG STRL

## (undated) DEVICE — DRSNG GZ CURAD XEROFORM NONADHR OVERWRAP 5X9IN

## (undated) DEVICE — ABDOMINAL BINDER, ILIO BYPASS: Brand: DEROYAL

## (undated) DEVICE — MARKR SKIN W/RULR AND LBL

## (undated) DEVICE — SLV SCD KN/LEN ADJ EXPRSS BLENDED MD 1P/U

## (undated) DEVICE — INTENDED FOR TISSUE SEPARATION, AND OTHER PROCEDURES THAT REQUIRE A SHARP SURGICAL BLADE TO PUNCTURE OR CUT.: Brand: BARD-PARKER ® CARBON RIB-BACK BLADES

## (undated) DEVICE — GOWN,REINFORCE,POLY,SIRUS,BREATH SLV,XLG: Brand: MEDLINE

## (undated) DEVICE — PLASTIC MAJOR-LF: Brand: MEDLINE INDUSTRIES, INC.

## (undated) DEVICE — PENCL E/S SMOKEEVAC W/TELESCP CANN

## (undated) DEVICE — STERILE POLYISOPRENE POWDER-FREE SURGICAL GLOVES WITH EMOLLIENT COATING: Brand: PROTEXIS

## (undated) DEVICE — ABDOMINAL BINDER: Brand: DEROYAL

## (undated) DEVICE — BASIC SINGLE BASIN-LF: Brand: MEDLINE INDUSTRIES, INC.